# Patient Record
Sex: FEMALE | Race: WHITE | URBAN - METROPOLITAN AREA
[De-identification: names, ages, dates, MRNs, and addresses within clinical notes are randomized per-mention and may not be internally consistent; named-entity substitution may affect disease eponyms.]

---

## 2022-10-13 ENCOUNTER — CLAIMS CREATED FROM THE CLAIM WINDOW (OUTPATIENT)
Dept: URBAN - METROPOLITAN AREA CLINIC 19 | Facility: CLINIC | Age: 67
End: 2022-10-13
Payer: MEDICARE

## 2022-10-13 ENCOUNTER — WEB ENCOUNTER (OUTPATIENT)
Dept: URBAN - METROPOLITAN AREA CLINIC 19 | Facility: CLINIC | Age: 67
End: 2022-10-13

## 2022-10-13 ENCOUNTER — LAB OUTSIDE AN ENCOUNTER (OUTPATIENT)
Dept: URBAN - METROPOLITAN AREA CLINIC 19 | Facility: CLINIC | Age: 67
End: 2022-10-13

## 2022-10-13 ENCOUNTER — DASHBOARD ENCOUNTERS (OUTPATIENT)
Age: 67
End: 2022-10-13

## 2022-10-13 VITALS
DIASTOLIC BLOOD PRESSURE: 82 MMHG | BODY MASS INDEX: 26.84 KG/M2 | SYSTOLIC BLOOD PRESSURE: 124 MMHG | OXYGEN SATURATION: 97 % | WEIGHT: 157.2 LBS | HEART RATE: 78 BPM | HEIGHT: 64 IN | TEMPERATURE: 98.2 F

## 2022-10-13 DIAGNOSIS — R10.84 GENERALIZED ABDOMINAL PAIN: ICD-10-CM

## 2022-10-13 DIAGNOSIS — K62.5 RECTAL BLEEDING: ICD-10-CM

## 2022-10-13 DIAGNOSIS — R19.7 DIARRHEA, UNSPECIFIED TYPE: ICD-10-CM

## 2022-10-13 DIAGNOSIS — Z86.010 HISTORY OF COLON POLYPS: ICD-10-CM

## 2022-10-13 PROCEDURE — 99244 OFF/OP CNSLTJ NEW/EST MOD 40: CPT | Performed by: NURSE PRACTITIONER

## 2022-10-13 PROCEDURE — 99204 OFFICE O/P NEW MOD 45 MIN: CPT | Performed by: NURSE PRACTITIONER

## 2022-10-13 RX ORDER — DICYCLOMINE HYDROCHLORIDE 20 MG/1
1 TABLET TABLET ORAL
Qty: 90 | Refills: 2 | OUTPATIENT
Start: 2022-10-13 | End: 2023-01-10

## 2022-10-13 RX ORDER — HYDROXYCHLOROQUINE SULFATE 200 MG/1
AS DIRECTED TABLET, FILM COATED ORAL
Status: ACTIVE | COMMUNITY

## 2022-10-13 RX ORDER — ESTRADIOL 0.1 MG/G
AS DIRECTED CREAM VAGINAL
Status: ACTIVE | COMMUNITY

## 2022-10-13 RX ORDER — ALPRAZOLAM 0.5 MG/1
1 TABLET TABLET ORAL TWICE A DAY
Status: ACTIVE | COMMUNITY

## 2022-10-13 NOTE — HPI-TODAY'S VISIT:
Carmen is a 67-year-old female who presents on referral from Dr. Denise Barton for abdominal pain and diarrhea. She has hx of chronic constipation but developed diarrhea 3 years ago. Stools are up to 8 times a day, watery/mushy loose with rare formed stool. She does have urgency and nocturnal stools. Admits occasional rectal bleeding with wiping. Admits generalized abdominal cramping, bloating/gas. She has never taken anything for her complaints. [place] and had Colonoscopy 2018 that showed 5mm cecal polyp, random BX done but she did not have path reports. She was to repeat in 5 years. Denies fever/chills, wt loss.  stool studies 6/2021 Pancreatic elast over 500 ABD US 2018 fatty liver She has RA and has swelling in joints, occasional rash.

## 2022-10-14 PROBLEM — 428283002: Status: ACTIVE | Noted: 2022-10-13

## 2022-10-15 LAB
A/G RATIO: 2
ALBUMIN: 4.8
ALKALINE PHOSPHATASE: 78
ALT (SGPT): 24
AST (SGOT): 24
BILIRUBIN, TOTAL: 0.6
BUN/CREATININE RATIO: (no result)
BUN: 19
C-REACTIVE PROTEIN, QUANT: 4.4
CALCIUM: 9.9
CARBON DIOXIDE, TOTAL: 26
CHLORIDE: 102
CREATININE: 0.61
EGFR: 97
GLOBULIN, TOTAL: 2.4
GLUCOSE: 99
HEMATOCRIT: 45.3
HEMOGLOBIN: 15.7
IMMUNOGLOBULIN A, QN, SERUM: 170
INTERPRETATION: (no result)
MCH: 32.7
MCHC: 34.7
MCV: 94.4
MPV: 9.2
PLATELET COUNT: 296
POTASSIUM: 3.8
PROTEIN, TOTAL: 7.2
RDW: 12.2
RED BLOOD CELL COUNT: 4.8
SED RATE BY MODIFIED: 2
SODIUM: 140
T-TRANSGLUTAMINASE (TTG) IGA: <1
WHITE BLOOD CELL COUNT: 9.3

## 2022-10-26 ENCOUNTER — LAB OUTSIDE AN ENCOUNTER (OUTPATIENT)
Dept: URBAN - METROPOLITAN AREA CLINIC 19 | Facility: CLINIC | Age: 67
End: 2022-10-26

## 2022-10-29 LAB
CALPROTECTIN, STOOL - QDX: (no result)
FECAL FAT, QUALITATIVE: (no result)
GASTROINTESTINAL PATHOGEN: (no result)

## 2022-11-07 ENCOUNTER — ERX REFILL RESPONSE (OUTPATIENT)
Dept: URBAN - METROPOLITAN AREA CLINIC 19 | Facility: CLINIC | Age: 67
End: 2022-11-07

## 2022-11-07 RX ORDER — DICYCLOMINE HYDROCHLORIDE 20 MG/1
1 TABLET TABLET ORAL
Qty: 90 | Refills: 2 | OUTPATIENT

## 2022-11-07 RX ORDER — DICYCLOMINE HYDROCHLORIDE 20 MG/1
1 TABLET ORALLY THREE TIMES A DAY AS NEEDED FOR PAIN 30 DAY(S) TABLET ORAL
Qty: 90 TABLET | Refills: 2 | OUTPATIENT

## 2022-11-16 ENCOUNTER — OFFICE VISIT (OUTPATIENT)
Dept: URBAN - METROPOLITAN AREA SURGERY CENTER 31 | Facility: SURGERY CENTER | Age: 67
End: 2022-11-16

## 2022-12-01 ENCOUNTER — OFFICE VISIT (OUTPATIENT)
Dept: URBAN - METROPOLITAN AREA SURGERY CENTER 31 | Facility: SURGERY CENTER | Age: 67
End: 2022-12-01

## 2022-12-01 ENCOUNTER — CLAIMS CREATED FROM THE CLAIM WINDOW (OUTPATIENT)
Dept: URBAN - METROPOLITAN AREA SURGERY CENTER 31 | Facility: SURGERY CENTER | Age: 67
End: 2022-12-01
Payer: MEDICARE

## 2022-12-01 ENCOUNTER — CLAIMS CREATED FROM THE CLAIM WINDOW (OUTPATIENT)
Dept: URBAN - METROPOLITAN AREA CLINIC 4 | Facility: CLINIC | Age: 67
End: 2022-12-01
Payer: MEDICARE

## 2022-12-01 DIAGNOSIS — D12.0 ADENOMA OF CECUM: ICD-10-CM

## 2022-12-01 DIAGNOSIS — R10.84 ABDOMINAL CRAMPING, GENERALIZED: ICD-10-CM

## 2022-12-01 DIAGNOSIS — R19.7 ACUTE DIARRHEA: ICD-10-CM

## 2022-12-01 DIAGNOSIS — K62.5 ANAL BLEEDING: ICD-10-CM

## 2022-12-01 DIAGNOSIS — D12.0 BENIGN NEOPLASM OF CECUM: ICD-10-CM

## 2022-12-01 PROCEDURE — 45380 COLONOSCOPY AND BIOPSY: CPT | Performed by: INTERNAL MEDICINE

## 2022-12-01 PROCEDURE — 88305 TISSUE EXAM BY PATHOLOGIST: CPT | Performed by: PATHOLOGY

## 2022-12-01 PROCEDURE — G8907 PT DOC NO EVENTS ON DISCHARG: HCPCS | Performed by: INTERNAL MEDICINE

## 2022-12-01 RX ORDER — HYDROXYCHLOROQUINE SULFATE 200 MG/1
AS DIRECTED TABLET, FILM COATED ORAL
Status: ACTIVE | COMMUNITY

## 2022-12-01 RX ORDER — DICYCLOMINE HYDROCHLORIDE 20 MG/1
1 TABLET ORALLY THREE TIMES A DAY AS NEEDED FOR PAIN 30 DAY(S) TABLET ORAL
Qty: 90 TABLET | Refills: 2 | Status: ACTIVE | COMMUNITY

## 2022-12-01 RX ORDER — ESTRADIOL 0.1 MG/G
AS DIRECTED CREAM VAGINAL
Status: ACTIVE | COMMUNITY

## 2022-12-01 RX ORDER — ALPRAZOLAM 0.5 MG/1
1 TABLET TABLET ORAL TWICE A DAY
Status: ACTIVE | COMMUNITY

## 2022-12-02 ENCOUNTER — WEB ENCOUNTER (OUTPATIENT)
Dept: URBAN - METROPOLITAN AREA CLINIC 19 | Facility: CLINIC | Age: 67
End: 2022-12-02

## 2023-01-23 ENCOUNTER — OFFICE VISIT (OUTPATIENT)
Dept: URBAN - METROPOLITAN AREA CLINIC 19 | Facility: CLINIC | Age: 68
End: 2023-01-23

## 2023-01-25 ENCOUNTER — OFFICE VISIT (OUTPATIENT)
Dept: URBAN - METROPOLITAN AREA CLINIC 19 | Facility: CLINIC | Age: 68
End: 2023-01-25

## 2023-02-02 ENCOUNTER — ERX REFILL RESPONSE (OUTPATIENT)
Dept: URBAN - METROPOLITAN AREA CLINIC 19 | Facility: CLINIC | Age: 68
End: 2023-02-02

## 2023-02-02 RX ORDER — DICYCLOMINE HYDROCHLORIDE 20 MG/1
1 TABLET ORALLY THREE TIMES A DAY AS NEEDED FOR PAIN 30 DAY(S) TABLET ORAL
Qty: 270 TABLET | Refills: 0 | OUTPATIENT

## 2023-02-02 RX ORDER — DICYCLOMINE HYDROCHLORIDE 20 MG/1
1 TABLET ORALLY THREE TIMES A DAY AS NEEDED FOR PAIN 30 DAY(S) TABLET ORAL
Qty: 90 TABLET | Refills: 2 | OUTPATIENT